# Patient Record
Sex: MALE | Race: WHITE | NOT HISPANIC OR LATINO | Employment: OTHER | URBAN - METROPOLITAN AREA
[De-identification: names, ages, dates, MRNs, and addresses within clinical notes are randomized per-mention and may not be internally consistent; named-entity substitution may affect disease eponyms.]

---

## 2019-03-07 ENCOUNTER — SURGERY - HEALTHEAST (OUTPATIENT)
Dept: SURGERY | Facility: HOSPITAL | Age: 64
End: 2019-03-07

## 2019-03-07 ENCOUNTER — ANESTHESIA - HEALTHEAST (OUTPATIENT)
Dept: SURGERY | Facility: HOSPITAL | Age: 64
End: 2019-03-07

## 2019-03-07 ASSESSMENT — MIFFLIN-ST. JEOR: SCORE: 1619.53

## 2021-06-02 VITALS — BODY MASS INDEX: 28.14 KG/M2 | WEIGHT: 190 LBS | HEIGHT: 69 IN

## 2021-06-16 PROBLEM — K35.80 ACUTE APPENDICITIS: Status: ACTIVE | Noted: 2019-03-07

## 2021-06-24 NOTE — ANESTHESIA PREPROCEDURE EVALUATION
Anesthesia Evaluation      Patient summary reviewed   No history of anesthetic complications     Airway   Mallampati: II  Neck ROM: full   Pulmonary - negative ROS and normal exam                          Cardiovascular - normal exam  (+) hypertension, , hypercholesterolemia,      Neuro/Psych - negative ROS     Endo/Other - negative ROS      GI/Hepatic/Renal - negative ROS           Dental - normal exam                        Anesthesia Plan  Planned anesthetic: general endotracheal    ASA 2   Induction: intravenous   Anesthetic plan and risks discussed with: patient    Post-op plan: routine recovery

## 2021-06-24 NOTE — ANESTHESIA POSTPROCEDURE EVALUATION
Patient: Sanjeev Tee  APPENDECTOMY, LAPAROSCOPIC  Anesthesia type: general    Patient location: PACU  Last vitals:   Vitals:    03/07/19 1320   BP: 108/56   Pulse: 62   Resp: 18   Temp:    SpO2: 94%     Post vital signs: stable  Level of consciousness: awake and responds to simple questions  Post-anesthesia pain: pain controlled  Post-anesthesia nausea and vomiting: no  Pulmonary: unassisted  Cardiovascular: stable  Hydration: adequate  Anesthetic events: no    QCDR Measures:  ASA# 11 - Ghazal-op Cardiac Arrest: ASA11B - Patient did NOT experience unanticipated cardiac arrest  ASA# 12 - Ghazal-op Mortality Rate: ASA12B - Patient did NOT die  ASA# 13 - PACU Re-Intubation Rate: ASA13B - Patient did NOT require a new airway mgmt  ASA# 10 - Composite Anes Safety: ASA10A - No serious adverse event    Additional Notes:

## 2021-06-24 NOTE — ANESTHESIA CARE TRANSFER NOTE
Last vitals:   Vitals:    03/07/19 1245   BP: 124/72   Pulse: 67   Resp: 16   Temp: (!) 38.3  C (100.9  F)   SpO2: 100%     Patient's level of consciousness is drowsy but awake. Denies pain. Denies nausea.   Spontaneous respirations: yes  Maintains airway independently: yes  Dentition unchanged: yes  Oropharynx: oropharynx clear of all foreign objects    QCDR Measures:  ASA# 20 - Surgical Safety Checklist: WHO surgical safety checklist completed prior to induction    PQRS# 430 - Adult PONV Prevention: 4558F - Pt received => 2 anti-emetic agents (different classes) preop & intraop  ASA# 8 - Peds PONV Prevention: NA - Not pediatric patient, not GA or 2 or more risk factors NOT present  PQRS# 424 - Ghazal-op Temp Management: 4559F - At least one body temp DOCUMENTED => 35.5C or 95.9F within required timeframe  PQRS# 426 - PACU Transfer Protocol: - Transfer of care checklist used  ASA# 14 - Acute Post-op Pain: ASA14B - Patient did NOT experience pain >= 7 out of 10

## 2022-10-04 ENCOUNTER — HOSPITAL ENCOUNTER (EMERGENCY)
Facility: HOSPITAL | Age: 67
Discharge: HOME | End: 2022-10-04
Payer: COMMERCIAL

## 2022-10-04 ENCOUNTER — APPOINTMENT (OUTPATIENT)
Dept: RADIOLOGY | Facility: HOSPITAL | Age: 67
End: 2022-10-04
Attending: EMERGENCY MEDICINE
Payer: COMMERCIAL

## 2022-10-04 VITALS
BODY MASS INDEX: 27.11 KG/M2 | DIASTOLIC BLOOD PRESSURE: 79 MMHG | TEMPERATURE: 98.1 F | OXYGEN SATURATION: 96 % | WEIGHT: 183 LBS | RESPIRATION RATE: 16 BRPM | SYSTOLIC BLOOD PRESSURE: 193 MMHG | HEIGHT: 69 IN | HEART RATE: 59 BPM

## 2022-10-04 DIAGNOSIS — S91.119A LACERATION OF TOE: ICD-10-CM

## 2022-10-04 PROCEDURE — 12002 RPR S/N/AX/GEN/TRNK2.6-7.5CM: CPT

## 2022-10-04 PROCEDURE — 99283 EMERGENCY DEPT VISIT LOW MDM: CPT

## 2022-10-04 PROCEDURE — 73630 X-RAY EXAM OF FOOT: CPT | Mod: RT

## 2022-10-04 RX ORDER — CEPHALEXIN 500 MG/1
500 CAPSULE ORAL 3 TIMES DAILY
Qty: 21 CAPSULE | Refills: 0 | Status: SHIPPED | OUTPATIENT
Start: 2022-10-04 | End: 2022-10-11

## 2022-10-04 ASSESSMENT — ENCOUNTER SYMPTOMS
NAUSEA: 0
VOMITING: 0
FEVER: 0
CHILLS: 0
BACK PAIN: 1
WOUND: 1

## 2022-10-04 NOTE — ED TRIAGE NOTES
Patient c/o accidentally dropped  and hit the right foot ( toe and has skin laceration about an inch size. Tetanus shot 2019     Triage Assessment     Row Name 10/04/22 1323       Triage Assessment (Adult)    Airway WDL WDL       Respiratory WDL    Respiratory WDL WDL       Skin Circulation/Temperature WDL    Skin Circulation/Temperature WDL WDL       Cardiac WDL    Cardiac WDL WDL       Peripheral/Neurovascular WDL    Peripheral Neurovascular WDL WDL       Cognitive/Neuro/Behavioral WDL    Cognitive/Neuro/Behavioral WDL WDL

## 2022-10-04 NOTE — DISCHARGE INSTRUCTIONS
Please bring this paperwork with you to your follow-up appointment.    You were seen in the urgent care/emergency department for right toe laceration.    This was 3cm and was closed with 5 stitches. We removed the debris that was in your wound, but we will give you an antibiotic to help prevent infection. Please take Keflex three times a day for 7 days for this.      Please keep the wound covered for the next 2-3 days and clean with warm water and mild soap after that.     Please have your stitches removed in 10-14 days. Come back to our ER or go to your regular primary clinic for removal    For your symptoms:    Tylenol/ibuprofen as needed  You may take up to 650 mg of Tylenol (acetaminophen) up to 4 times daily and up to 600 mg of ibuprofen up to 4 times daily as needed for fever, pain.  Please do not take more than the daily maximum recommended dose (tylenol = 4 grams, ibuprofen = 2.4 grams) as it can cause harm to your liver, kidneys, stomach.  It is best to take ibuprofen with food. Please read labels of any over-the-counter medicine you may be taking as it may contain Tylenol (acetaminophen) or Advil (ibuprofen).     Follow up with your primary care provider for recheck     Return to the emergency department if you develop worsening pain, fevers, chills, surrounding redness, warmth or drainage, or any other new worsening or concerning symptoms. We'd be happy to see you again.    Thank you for allowing us to be part of your care today.    Take care!  -Dina Nieto PA-C

## 2022-10-04 NOTE — ED PROVIDER NOTES
EMERGENCY DEPARTMENT ENCOUNTER      NAME: Sanjeev Tee  AGE: 67 year old male  YOB: 1955  MRN: [id number]  EVALUATION DATE & TIME: No admission date for patient encounter.    PCP: Tricia Morales    ED PROVIDER: Dina Nieto PA-C      Chief Complaint   Patient presents with     Laceration         FINAL IMPRESSION:  1. Laceration of toe      MEDICAL DECISION MAKING:    Pertinent Labs & Imaging studies reviewed. (See chart for details)  Sanjeev Tee is a 67 year old male who presents for evaluation of right great toe laceration sustained 1 hour prior to evaluation after part of a  fell onto his right foot cutting the base of his great toe.  Patient cleaned wound extensively prior to coming to the ER. Tetanus is up to date. Not on  Blood thinners. No other injuries.     On my initial evaluation, slightly hypertensive, but remainder of vital signs normal. Recheck of BP was improved prior to discharge. On physical exam patient is awake, alert, no acute distress, resting comfortably in chair.  He does have his right foot wrapped in Coban this was removed and revealed a 3 cm laceration to the dorsal aspect of right great toe at the base of the toe.  No significant active bleeding.  No obvious foreign body on initial inspection.  Patient has good cap refill, good pulses.  He does have some decreased sensation, but patient reports this is his baseline and not changed since laceration.  He does have difficulty wiggling his toes and actively range of motion his ankle, but reports is also his baseline from a previous stroke and back injury. Able to ambulate without difficulty.     After cleansing the skin and injecting lidocaine, wound was extensively irrigated and explored.  Some small debris was removed, but no obvious foreign body remained.  His x-ray was negative for any fracture or metallic foreign body seen.  After wound was extensively debrided without obvious remainder of  foreign body, was closed with 5 sutures. He tolerated procedure well.  Bacitracin ointment was placed and sterile dressing was placed.     Based on slight contamination as well as laceration through shoe, will prescribe prophylactic antibiotics.  Advised patient to present with worsening redness, swelling, drainage or fevers and advised patient to have sutures removed in 10 days.    Patient has had serial examinations and notes significant improvement.     Patient was discharged in stable condition with treatment plan as below. Instructed to follow up with primary care provider in 10 to 14 days for suture removal and return to the emergency department with any new or worsening of symptoms. Patient expressed understanding, feels comfortable, and is in agreement with this plan. All questions addressed prior to discharge.    ED COURSE:  5:39 PM  I reviewed the patient's chart. I met with the patient to gather history and to perform my initial exam.    I wore appropriate PPE during this encounter including: facemask & eye protection   6:05 PM performed laceration repair  6:40 PM  We discussed plan for discharge including treatment plan, follow-up and return precautions to emergency department.  Patient voiced understanding and in agreement with this plan.    At the conclusion of the encounter I discussed the results of all of the tests and the disposition. The questions were answered. The patient or family acknowledged understanding and was agreeable with the care plan.     MEDICATIONS GIVEN IN THE EMERGENCY:  Medications - No data to display    NEW PRESCRIPTIONS STARTED AT TODAY'S ER VISIT  New Prescriptions    CEPHALEXIN (KEFLEX) 500 MG CAPSULE    Take 1 capsule (500 mg) by mouth 3 times daily for 7 days            =================================================================    HPI:    Patient information was obtained from: patient    Use of Interpretor: N/A        Sanjeev Tee is a 67 year old male with a  pertinent history of HTN, HLD, chronic pain who presents to this ED for evaluation of right foot laceration.    Patient reports about 1 hour prior to evaluation he was working with a  in his garage.  Part of the machine piece fell off the table he was working on and landed onto his right foot slicing the top part of his big toe.  He was wearing a shoe and part of the blade cut into his shoe as well.  He took his shoe off and noticed his sock was completely covered in blood, such above his sock and started washing out his wound.  Thinks that there is still some metal pieces stuck in there.  Denies any pain, but reports he does have chronic peripheral neuropathy and does not feel very well in his foot at baseline.  Has a history of chronic back pain as well as history of stroke so has baseline difficulty to walk, feel and move his right foot.  Believes his tetanus is up-to-date.  Denies other complaints at this time.    REVIEW OF SYSTEMS:  Review of Systems   Constitutional: Negative for chills and fever.   Gastrointestinal: Negative for nausea and vomiting.   Musculoskeletal: Positive for back pain (chronic).   Skin: Positive for wound.        Right great toe laceration     Neurological:        Has baseline numbness and decreased ability to move right toe and foot, decreased ROM at baseline   All other systems reviewed and are negative.       PAST MEDICAL HISTORY:  No past medical history on file.    PAST SURGICAL HISTORY:  Past Surgical History:   Procedure Laterality Date     PA LAP,APPENDECTOMY N/A 3/7/2019    Procedure: APPENDECTOMY, LAPAROSCOPIC;  Surgeon: Jules Banegas MD;  Location: [facility] [place];  Service: General       CURRENT MEDICATIONS:    No current facility-administered medications for this encounter.    Current Outpatient Medications:      cephALEXin (KEFLEX) 500 MG capsule, Take 1 capsule (500 mg) by mouth 3 times daily for 7 days, Disp: 21 capsule, Rfl: 0     amLODIPine (NORVASC)  "5 MG tablet, [AMLODIPINE (NORVASC) 5 MG TABLET] Take 5 mg by mouth at bedtime., Disp: , Rfl:      aspirin 81 MG EC tablet, [ASPIRIN 81 MG EC TABLET] Take 81 mg by mouth daily., Disp: , Rfl:      atenolol (TENORMIN) 100 MG tablet, [ATENOLOL (TENORMIN) 100 MG TABLET] Take 100 mg by mouth daily., Disp: , Rfl:      atorvastatin (LIPITOR) 20 MG tablet, [ATORVASTATIN (LIPITOR) 20 MG TABLET] Take 20 mg by mouth at bedtime., Disp: , Rfl:      cyanocobalamin 1000 MCG tablet, [CYANOCOBALAMIN 1000 MCG TABLET] Take 1,000 mcg by mouth daily., Disp: , Rfl:      lisinopril (PRINIVIL,ZESTRIL) 40 MG tablet, [LISINOPRIL (PRINIVIL,ZESTRIL) 40 MG TABLET] Take 40 mg by mouth daily., Disp: , Rfl:      magnesium oxide (MAG-OX) 400 mg (241.3 mg magnesium) tablet, [MAGNESIUM OXIDE (MAG-OX) 400 MG (241.3 MG MAGNESIUM) TABLET] Take 400 mg by mouth daily., Disp: , Rfl:      multivitamin therapeutic tablet, [MULTIVITAMIN THERAPEUTIC TABLET] Take 1 tablet by mouth daily., Disp: , Rfl:      oxyCODONE (ROXICODONE) 5 MG immediate release tablet, [OXYCODONE (ROXICODONE) 5 MG IMMEDIATE RELEASE TABLET] Take 1 tablet (5 mg total) by mouth every 4 (four) hours as needed for pain., Disp: 13 tablet, Rfl: 0    ALLERGIES:  Allergies   Allergen Reactions     Azithromycin Swelling     Facial swelling, tachycardia       FAMILY HISTORY:  No family history on file.    SOCIAL HISTORY:   Social History     Socioeconomic History     Marital status:    Tobacco Use     Smoking status: Former Smoker     Packs/day: 1.50     Quit date: 2012     Years since quittin.8     Smokeless tobacco: Never Used   Substance and Sexual Activity     Alcohol use: No     Drug use: No       VITALS:  Patient Vitals for the past 24 hrs:   BP Temp Temp src Pulse Resp SpO2 Height Weight   10/04/22 1318 (!) 193/79 98.1  F (36.7  C) Oral 59 16 96 % 1.753 m (5' 9\") 83 kg (183 lb)       PHYSICAL EXAM    Constitutional: Well developed, Well nourished, NAD  HENT: Normocephalic, " Atraumatic, Bilateral external ears normal, Oropharynx normal, mucous membranes moist, Nose normal.   Neck: Normal range of motion, No tenderness, Supple, No stridor.  Eyes: PERRL, EOMI, Conjunctiva normal, No discharge.   Respiratory: Normal breath sounds, No respiratory distress, No wheezing, Speaks full sentences easily. No cough.  Cardiovascular: Normal heart rate, Regular rhythm, No murmurs, No rubs, No gallops. Chest wall nontender.  GI: Soft, No tenderness, No masses, No flank tenderness. No rebound or guarding.  Musculoskeletal: 2+ DP pulses. No edema. No cyanosis, No clubbing. Good range of motion in all major joints. No tenderness to palpation or major deformities noted. No tenderness of the CTLS spine.   Integument:  right foot wrapped in Coban this was removed and revealed a 3 cm laceration to the dorsal aspect of right great toe at the base of the toe.  No significant active bleeding.  No obvious foreign body on initial inspection.  Patient has good cap refill, good pulses.  He does have some decreased sensation, but patient reports this is his baseline and not changed since laceration.  He does have difficulty wiggling his toes and actively range of motion his ankle, but reports is also his baseline from a previous stroke and back injury.   Neurologic: Alert & oriented x 3,  Normal gait.  Psychiatric: Affect normal, Judgment normal, Mood normal. Cooperative.    LAB:  All pertinent labs reviewed and interpreted.  Labs Ordered and Resulted from Time of ED Arrival to Time of ED Departure - No data to display    RADIOLOGY:  Reviewed all pertinent imaging. Please see official radiology report.  Foot  XR, G/E 3 views, right   Final Result   IMPRESSION: Bandaging overlying the great toe. Calcific density foreign material projecting over soft tissues of the the base of the great toe. No metallic density foreign body. Normal joint spaces and alignment. No acute fracture. Small likely    incidental and benign  sclerotic lesion distal tibia.        PROCEDURES:   PROCEDURE:  Laceration Repair   INDICATIONS: Laceration   PROCEDURE PROVIDER: Dina Nieto PA-C   SITE: Right great toe   TYPE/SIZE: A simple slightly contaminated 3 cm laceration.   FUNCTIONAL ASSESSMENT: Distally/surrounding area decreased sensation is baseline per patient remainder of right ankle and shin normal   MEDICATION: lidocaine.  Injecting 6 mls.   PREPARATION: Betadine and irrigated with sterile water    DEBRIDEMENT: Small amount of debris was removed, wound was extensively irrigated and explored for additional foreign bodies, these were removed. No foreign body remained on closure of wound   CLOSURE:  Wound was closed in 1 layer  Total number of sutures/staples placed: 5           Diagnosis:  1. Laceration of toe      Dina Nieto PA-C  Emergency Medicine  [facility]  10/4/2022       Dina Nieto PA-C  10/04/22 1914

## 2022-10-04 NOTE — ED NOTES
ED Provider In Triage Note  [facility]  Encounter Date: Oct 4, 2022    Chief Complaint   Patient presents with     Laceration       Brief HPI:   Sanjeev Tee is a 67 year old male presenting to the Emergency Department with a chief complaint of foot injury.  Patient cut the top of his foot with a  this morning, cut through his shoe and onto the top of his foot near the base of his right great toe.  Patient reports chronic back issues and baseline inability to extend his right toe.    Brief Physical Exam:  There were no vitals taken for this visit.  General: Non-toxic appearing  HEENT: Atraumatic  Resp: No respiratory distress  Abdomen: Non-peritoneal  Neuro: Alert, oriented, answers questions appropriately  Psych: Behavior appropriate  MSK: ~2cm laceration to dorsum of L foot at base of L great toe    Plan Initiated in Triage:  Orders Placed This Encounter     Foot  XR, G/E 3 views, right       PIT Dispo:   Return to lobby while awaiting workup and ED bed availability     Patient with a laceration to the dorsum of his right foot at the base of the right great toe.  Will require irrigation and primary closure.  Given mechanism, x-rays were ordered.  Tetanus up-to-date.    Keo Grimm MD on 10/4/2022 at 1:18 PM    Patient was evaluated by the Physician in Triage due to a limitation of available rooms in the Emergency Department. A plan of care was discussed based on the information obtained on the initial evaluation and patient was consuled to return back to the Emergency Department lobby after this initial evalutaiton until results were obtained or a room became available in the Emergency Department. Patient was counseled not to leave prior to receiving the results of their workup.     Keo Grimm MD  [facility] EMERGENCY DEPARTMENT  [address] [place] [id number]  [phone]     Keo Grimm MD  10/04/22 2650